# Patient Record
Sex: FEMALE | Race: WHITE | Employment: OTHER | URBAN - METROPOLITAN AREA
[De-identification: names, ages, dates, MRNs, and addresses within clinical notes are randomized per-mention and may not be internally consistent; named-entity substitution may affect disease eponyms.]

---

## 2019-09-03 ENCOUNTER — TRANSFERRED RECORDS (OUTPATIENT)
Dept: HEALTH INFORMATION MANAGEMENT | Facility: CLINIC | Age: 84
End: 2019-09-03

## 2019-09-03 ENCOUNTER — MEDICAL CORRESPONDENCE (OUTPATIENT)
Dept: HEALTH INFORMATION MANAGEMENT | Facility: CLINIC | Age: 84
End: 2019-09-03

## 2019-09-06 ENCOUNTER — TELEPHONE (OUTPATIENT)
Dept: OPHTHALMOLOGY | Facility: CLINIC | Age: 84
End: 2019-09-06

## 2019-09-06 NOTE — TELEPHONE ENCOUNTER
Referral received and facilitator aware and will be calling next week on scheduling  Adrian Giles RN friday 5:15 PM 09/06/19        [facility] Call Center    Phone Message    May a detailed message be left on voicemail: yes    Reason for Call: Form or Letter   Type or form/letter needing completion: Referral  Provider: Eye Neurology (Any Provider)    Pt got a referral sent TO us from a stroke specialist in [facility], [place] . Wants to know if we have received that referral so we can get her scheduled. 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

## 2019-09-24 ENCOUNTER — OFFICE VISIT (OUTPATIENT)
Dept: OPHTHALMOLOGY | Facility: CLINIC | Age: 84
End: 2019-09-24
Attending: OPHTHALMOLOGY
Payer: MEDICARE

## 2019-09-24 DIAGNOSIS — H53.10 SUBJECTIVE VISUAL DISTURBANCE: ICD-10-CM

## 2019-09-24 DIAGNOSIS — H53.40 VISUAL FIELD DEFECT: Primary | ICD-10-CM

## 2019-09-24 DIAGNOSIS — H47.20 OPTIC ATROPHY: ICD-10-CM

## 2019-09-24 DIAGNOSIS — H53.16 CHARLES BONNET SYNDROME: ICD-10-CM

## 2019-09-24 DIAGNOSIS — H53.10 SUBJECTIVE VISUAL DISTURBANCE: Primary | ICD-10-CM

## 2019-09-24 PROCEDURE — 92133 CPTRZD OPH DX IMG PST SGM ON: CPT | Mod: ZF | Performed by: OPHTHALMOLOGY

## 2019-09-24 PROCEDURE — 92083 EXTENDED VISUAL FIELD XM: CPT | Mod: ZF | Performed by: OPHTHALMOLOGY

## 2019-09-24 PROCEDURE — G0463 HOSPITAL OUTPT CLINIC VISIT: HCPCS | Mod: ZF | Performed by: TECHNICIAN/TECHNOLOGIST

## 2019-09-24 RX ORDER — LATANOPROST 50 UG/ML
1 SOLUTION/ DROPS OPHTHALMIC
COMMUNITY
Start: 2019-08-12

## 2019-09-24 RX ORDER — CHLORAL HYDRATE 500 MG
1 CAPSULE ORAL
COMMUNITY

## 2019-09-24 RX ORDER — LORAZEPAM 0.5 MG/1
0.5 TABLET ORAL
COMMUNITY
Start: 2019-03-11

## 2019-09-24 RX ORDER — ASCORBIC ACID 500 MG
500 TABLET ORAL
COMMUNITY

## 2019-09-24 RX ORDER — DOCUSATE SODIUM 100 MG/1
100 CAPSULE, LIQUID FILLED ORAL
COMMUNITY

## 2019-09-24 RX ORDER — GLIPIZIDE 5 MG/1
10 TABLET ORAL
COMMUNITY
Start: 2019-04-11

## 2019-09-24 RX ORDER — LISINOPRIL 10 MG/1
10 TABLET ORAL
COMMUNITY
Start: 2019-05-10

## 2019-09-24 RX ORDER — HYDROCHLOROTHIAZIDE 25 MG/1
25 TABLET ORAL
COMMUNITY
Start: 2019-05-20

## 2019-09-24 RX ORDER — CLOPIDOGREL BISULFATE 75 MG/1
75 TABLET ORAL
COMMUNITY
Start: 2019-08-31 | End: 2020-08-30

## 2019-09-24 RX ORDER — CLOPIDOGREL 300 MG/1
300 TABLET, FILM COATED ORAL
COMMUNITY
Start: 2019-08-30

## 2019-09-24 RX ORDER — METOPROLOL TARTRATE 25 MG/1
25 TABLET, FILM COATED ORAL
COMMUNITY
Start: 2019-06-14

## 2019-09-24 RX ORDER — ACETAMINOPHEN 500 MG
500-1000 TABLET ORAL
COMMUNITY
Start: 2017-10-17

## 2019-09-24 RX ORDER — PANTOPRAZOLE SODIUM 40 MG/1
40 TABLET, DELAYED RELEASE ORAL
COMMUNITY
Start: 2018-09-07

## 2019-09-24 RX ORDER — DORZOLAMIDE HYDROCHLORIDE AND TIMOLOL MALEATE 20; 5 MG/ML; MG/ML
1 SOLUTION/ DROPS OPHTHALMIC
COMMUNITY
Start: 2019-08-12 | End: 2020-08-11

## 2019-09-24 ASSESSMENT — CONF VISUAL FIELD
OS_INFERIOR_NASAL_RESTRICTION: 1
OS_SUPERIOR_NASAL_RESTRICTION: 3
OD_NORMAL: 1
METHOD: COUNTING FINGERS

## 2019-09-24 ASSESSMENT — REFRACTION_WEARINGRX
OS_ADD: +2.50
SPECS_TYPE: BIFOCAL
OD_SPHERE: -2.00
OS_SPHERE: -1.50
OS_CYLINDER: +0.50
OS_AXIS: 170
OD_CYLINDER: +0.50
OD_ADD: +2.50
OD_AXIS: 040

## 2019-09-24 ASSESSMENT — SLIT LAMP EXAM - LIDS
COMMENTS: MGD
COMMENTS: MGD

## 2019-09-24 ASSESSMENT — VISUAL ACUITY
METHOD: SNELLEN - LINEAR
CORRECTION_TYPE: GLASSES
OD_CC: 20/50
OD_CC+: +1

## 2019-09-24 ASSESSMENT — TONOMETRY
IOP_METHOD: ICARE
OD_IOP_MMHG: 20
OS_IOP_MMHG: 18

## 2019-09-24 ASSESSMENT — CUP TO DISC RATIO
OD_RATIO: 0.85
OS_RATIO: 0.99

## 2019-09-24 ASSESSMENT — ENCOUNTER SYMPTOMS: JOINT SWELLING: 1

## 2019-09-24 NOTE — Clinical Note
9/24/2019       RE: Anika Swanson  [address] [place] [address]     Dear Colleague,    Thank you for referring your patient, Anika Swanson, to the EYE CLINIC at [facility]. Please see a copy of my visit note below.           Assessment & Plan     Anika Swanson is a 83 year old female with the following diagnoses:   1. Visual field defect    2. Subjective visual disturbance    3. Optic atrophy       Patient is an 83 year old female sent by Renetta Pichardo for evaluation of visual hallucinations concerned for Corby Bonnet syndrome. She saw Dr. Martinez in August for evaluation of visual hallucinations that started August 1st. She notes that her visual hallucinations stopped September 15th. She is aware that the hallucinations are not real. She notes that the hallucinations initially started as her visual field appearing red before turning another color. She sees formed hallucinations including people as well as after images. On August 29th she was not feeling well and went to the ED and had MRI and was told that she had a right parietal infarction. Was started on Clopidogrel after the infarct found. Reports fluctuating vision. Denies diplopia, numbness/tingling, tremor, shuffling gait.     MRI:   small focus of cortical and subcortical restricted diffusion upper  anterior right parietal lobe with decreased on ADC and faint FLAIR signal  consistent with acute small infarct negative for definite hemorrhage or  evidence of mass.  Mild generalized cerebral atrophy.  Few nonspecific  scattered white matter T2 hyperintensities.  Old lacunar infarct right  thalamus.  Major flow voids are preserved.  Moderate left mastoid mucosal  thickening.  Trace of fluid in the left maxillary sinus.  Probable prior  cataract surgeries.    IMPRESSION:  Small cortical/subcortical right parietal acute infarct.    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 Intraocular pressure is 20 and 18. Pupils without afferent pupillary defect. Color plates 1/11 right eye and 0/11 right eye. Confrontational visual fields with nasal loss. Motility full. Slit lamp exam remarkable for pseudophakia in both eyes Dilated fundus exam shows cupping and disc pallor in both eyes greater in the left eye with cup-to-disc ratio of 0.85 right eye with loss of inferior neuroretinal rim; left eye with 0.99 cup-to-disc ratio.  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      Visual fields unreliable in both eyes with inferior arcuate defect right eye and inferior arcuate defect in the left eye with loss of fixation. OCT rNFL with diffuse thinning in both eyes; general thickness 45 right eye and 36 left eye.     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  We discussed possible etiologies including dementia, loss of vision, and medications.  We discussed that hers is most likely the result of her severe glaucoma. Recommend low vision occupational therapy.      Patient has severe glaucoma and her intraocular pressure is above goal today in both eyes.  She recently underwent Selected laser trabeculoplasty (SLT) in the left eye and planning Selected laser trabeculoplasty (SLT) in the RIGHT eye in the near future.  Recommend continuing monitoring and treatment.  Can consider referral to a glaucoma specialist if desires in the near future.           Attending Physician Attestation:  Complete documentation of historical and exam elements from today's encounter can be found in the full encounter summary report (not reduplicated in this progress note).  I personally  obtained the chief complaint(s) and history of present illness.  I confirmed and edited as necessary the review of systems, past medical/surgical history, family history, social history, and examination findings as documented by others; and I examined the patient myself.  I personally reviewed the relevant tests, images, and reports as documented above.  I formulated and edited as necessary the assessment and plan and discussed the findings and management plan with the patient and family. - Fernie Yusuf MD  Ophthalmology, PGY-5  Neuro-Ophthalmology Fellow        Again, thank you for allowing me to participate in the care of your patient.      Sincerely,    Fernie Dickerson MD

## 2019-09-24 NOTE — LETTER
2019         RE:  :  MRN: Anika Swanson  1935  [id number]     Dear Dr. Pichardo,    Thank you for asking me to see your very pleasant patient, Anika Swanson, in neuro-ophthalmic consultation.  I would like to thank you for sending your records and I have summarized them in the history of present illness. She presented with her daughter who provided additional history.  My assessment and plan are below.  For further details, please see my attached clinic note.      Assessment & Plan   Anika Swanson is a 83 year old female with the following diagnoses:   1. Visual field defect    2. Subjective visual disturbance    3. Optic atrophy       Patient is an 83 year old female sent by Renetta Pichardo for evaluation of visual hallucinations concerned for Corby Bonnet syndrome. She saw Dr. Martinez in August for evaluation of visual hallucinations that started . She notes that her visual hallucinations stopped . She is aware that the hallucinations are not real. She notes that the hallucinations initially started as her visual field appearing red before turning another color. She sees formed hallucinations including people as well as after images. On  she was not feeling well and went to the ED and had MRI and was told that she had a right parietal infarction. Was started on Clopidogrel after the infarct found. Reports fluctuating vision. Denies diplopia, numbness/tingling, tremor, shuffling gait.     MRI: Small focus of cortical and subcortical restricted diffusion upper  anterior right parietal lobe with decreased on ADC and faint FLAIR signal  consistent with acute small infarct negative for definite hemorrhage or  evidence of mass.  Mild generalized cerebral atrophy.  Few nonspecific  scattered white matter T2 hyperintensities.  Old lacunar infarct right  thalamus.  Major flow voids are preserved.  Moderate left mastoid mucosal  thickening.  Trace of  fluid in the left maxillary sinus.  Probable prior  cataract surgeries.    IMPRESSION: Small cortical/subcortical right parietal acute infarct.    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 Intraocular pressure is 20 and 18. Pupils without afferent pupillary defect. Color plates 1/11 right eye and 0/11 right eye. Confrontational visual fields with nasal loss. Motility full. Slit lamp exam remarkable for pseudophakia in both eyes Dilated fundus exam shows cupping and disc pallor in both eyes greater in the left eye with cup-to-disc ratio of 0.85 right eye with loss of inferior neuroretinal rim; left eye with 0.99 cup-to-disc ratio.  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      Visual fields unreliable in both eyes with inferior arcuate defect right eye and inferior arcuate defect in the left eye with loss of fixation. OCT rNFL with diffuse thinning in both eyes; general thickness 45 right eye and 36 left eye.     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  We discussed possible etiologies including dementia, loss of vision, and medications.  We discussed that hers is most likely the result of her severe glaucoma. Recommend low vision occupational therapy.      Patient has severe glaucoma and her intraocular pressure is above goal today in both eyes.  She recently underwent Selected laser trabeculoplasty (SLT) in the left eye and planning Selected laser trabeculoplasty (SLT) in the RIGHT eye in the near future.  Recommend continuing monitoring and treatment.  Can consider referral to a glaucoma specialist if desires in the near future.      Again, thank you for  allowing me to participate in the care of your patient.      Sincerely,    Fernie Dickerson MD  Professor  Ophthalmology Residency   Director of Neuro-Ophthalmology  [employer] Endowed Chair  Departments of Ophthalmology, Neurology, and Neurosurgery  [facility] [address] [place] [address]  [id number]  T - [phone]  F - [phone]  LEROY Hua [email or web address]    CC: Renetta Pichardo NP  [place] [address]  VIA Facsimile: [phone]     Jeremiah Larose MD  [facility]  [address] [place] [address]  VIA Facsimile: [phone]

## 2019-09-24 NOTE — PROGRESS NOTES
Assessment & Plan     Anika Swanson is a 83 year old female with the following diagnoses:   1. Visual field defect    2. Subjective visual disturbance    3. Optic atrophy       Patient is an 83 year old female sent by Renetta Pichardo for evaluation of visual hallucinations concerned for Corby Bonnet syndrome. She saw Dr. Martinez in August for evaluation of visual hallucinations that started August 1st. She notes that her visual hallucinations stopped September 15th. She is aware that the hallucinations are not real. She notes that the hallucinations initially started as her visual field appearing red before turning another color. She sees formed hallucinations including people as well as after images. On August 29th she was not feeling well and went to the ED and had MRI and was told that she had a right parietal infarction. Was started on Clopidogrel after the infarct found. Reports fluctuating vision. Denies diplopia, numbness/tingling, tremor, shuffling gait.     MRI:   small focus of cortical and subcortical restricted diffusion upper  anterior right parietal lobe with decreased on ADC and faint FLAIR signal  consistent with acute small infarct negative for definite hemorrhage or  evidence of mass.  Mild generalized cerebral atrophy.  Few nonspecific  scattered white matter T2 hyperintensities.  Old lacunar infarct right  thalamus.  Major flow voids are preserved.  Moderate left mastoid mucosal  thickening.  Trace of fluid in the left maxillary sinus.  Probable prior  cataract surgeries.    IMPRESSION:  Small cortical/subcortical right parietal acute infarct.    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 Intraocular pressure is 20 and 18. Pupils without afferent pupillary defect. Color plates 1/11 right eye and 0/11 right eye. Confrontational visual fields with nasal loss. Motility full. Slit lamp exam remarkable for pseudophakia in both eyes Dilated fundus exam shows cupping and disc pallor in both eyes greater in the left eye with cup-to-disc ratio of 0.85 right eye with loss of inferior neuroretinal rim; left eye with 0.99 cup-to-disc ratio.  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      Visual fields unreliable in both eyes with inferior arcuate defect right eye and inferior arcuate defect in the left eye with loss of fixation. OCT rNFL with diffuse thinning in both eyes; general thickness 45 right eye and 36 left eye.     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  We discussed possible etiologies including dementia, loss of vision, and medications.  We discussed that hers is most likely the result of her severe glaucoma. Recommend low vision occupational therapy.      Patient has severe glaucoma and her intraocular pressure is above goal today in both eyes.  She recently underwent Selected laser trabeculoplasty (SLT) in the left eye and planning Selected laser trabeculoplasty (SLT) in the RIGHT eye in the near future.  Recommend continuing monitoring and treatment.  Can consider referral to a glaucoma specialist if desires in the near future.           Attending Physician Attestation:  Complete documentation of historical and exam elements from today's encounter can be found in the full encounter summary report (not reduplicated in this progress note).  I personally obtained the chief complaint(s) and history of present illness.  I confirmed and edited as necessary the review of systems, past medical/surgical history, family history, social history, and examination findings as documented by others; and I examined the patient myself.  I  personally reviewed the relevant tests, images, and reports as documented above.  I formulated and edited as necessary the assessment and plan and discussed the findings and management plan with the patient and family. - Fernie Yusuf MD  Ophthalmology, PGY-5  Neuro-Ophthalmology Fellow

## 2019-09-24 NOTE — NURSING NOTE
Chief Complaint(s) and History of Present Illness(es)     New Patient     In both eyes (Consult for loss of vision).  Associated symptoms include flashes and floaters.              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 Patient and family states they wanted to wait until this visit to determine if getting laser in the right eye is appropriate.     +Possible Corby bonnet syndrome.  +visual hallucinations for 6 weeks, have not have one visual hallucinations since 9/15  +curtain with curvy green lines    Vision each eye still getting progressively getting worse however.     Linda Love CO 9/24/2019 1:35 PM

## 2019-09-25 ENCOUNTER — TELEPHONE (OUTPATIENT)
Dept: OPHTHALMOLOGY | Facility: CLINIC | Age: 84
End: 2019-09-25

## 2019-09-25 NOTE — TELEPHONE ENCOUNTER
[facility] Call Center    Phone Message    May a detailed message be left on voicemail: yes    Reason for Call: Other: Asking recommendations for a glaucoma specialist in the [place] area. Please call pt's daughter Vicky at [phone].       Action Taken: Message routed to:  [facility] Eye

## 2019-09-30 NOTE — TELEPHONE ENCOUNTER
Spoke to patient's daughter.  They have scheduled an appt with Dr. Hernandez at  eye.  I faxed records to them per their request.  Serg will keep [facility] in [place] in mind for in the future if needed.      Ling Waterman on 9/30/2019 at 3:13 PM

## 2020-03-11 ENCOUNTER — HEALTH MAINTENANCE LETTER (OUTPATIENT)
Age: 85
End: 2020-03-11

## 2021-01-03 ENCOUNTER — HEALTH MAINTENANCE LETTER (OUTPATIENT)
Age: 86
End: 2021-01-03

## 2021-04-25 ENCOUNTER — HEALTH MAINTENANCE LETTER (OUTPATIENT)
Age: 86
End: 2021-04-25

## 2021-10-10 ENCOUNTER — HEALTH MAINTENANCE LETTER (OUTPATIENT)
Age: 86
End: 2021-10-10

## 2022-05-22 ENCOUNTER — HEALTH MAINTENANCE LETTER (OUTPATIENT)
Age: 87
End: 2022-05-22

## 2022-09-18 ENCOUNTER — HEALTH MAINTENANCE LETTER (OUTPATIENT)
Age: 87
End: 2022-09-18

## 2023-06-04 ENCOUNTER — HEALTH MAINTENANCE LETTER (OUTPATIENT)
Age: 88
End: 2023-06-04